# Patient Record
Sex: FEMALE | Race: BLACK OR AFRICAN AMERICAN | Employment: FULL TIME | URBAN - METROPOLITAN AREA
[De-identification: names, ages, dates, MRNs, and addresses within clinical notes are randomized per-mention and may not be internally consistent; named-entity substitution may affect disease eponyms.]

---

## 2017-01-17 ENCOUNTER — OFFICE VISIT (OUTPATIENT)
Dept: URGENT CARE | Facility: URGENT CARE | Age: 22
End: 2017-01-17
Payer: COMMERCIAL

## 2017-01-17 VITALS
HEART RATE: 94 BPM | HEIGHT: 66 IN | TEMPERATURE: 98.4 F | OXYGEN SATURATION: 97 % | DIASTOLIC BLOOD PRESSURE: 68 MMHG | SYSTOLIC BLOOD PRESSURE: 118 MMHG | BODY MASS INDEX: 27.11 KG/M2 | WEIGHT: 168.7 LBS

## 2017-01-17 DIAGNOSIS — J03.90 SUPPURATIVE TONSILLITIS: Primary | ICD-10-CM

## 2017-01-17 DIAGNOSIS — R07.0 THROAT PAIN: ICD-10-CM

## 2017-01-17 LAB
DEPRECATED S PYO AG THROAT QL EIA: NORMAL
MICRO REPORT STATUS: NORMAL
SPECIMEN SOURCE: NORMAL

## 2017-01-17 PROCEDURE — 87081 CULTURE SCREEN ONLY: CPT | Performed by: PHYSICIAN ASSISTANT

## 2017-01-17 PROCEDURE — 87880 STREP A ASSAY W/OPTIC: CPT | Performed by: PHYSICIAN ASSISTANT

## 2017-01-17 PROCEDURE — 99214 OFFICE O/P EST MOD 30 MIN: CPT | Performed by: PHYSICIAN ASSISTANT

## 2017-01-17 RX ORDER — DIPHENHYDRAMINE HCL 25 MG
25 CAPSULE ORAL
Qty: 20 CAPSULE | Refills: 0 | Status: ON HOLD | OUTPATIENT
Start: 2017-01-17 | End: 2017-09-14

## 2017-01-17 RX ORDER — AMOXICILLIN 875 MG
875 TABLET ORAL 2 TIMES DAILY
Qty: 20 TABLET | Refills: 0 | Status: ON HOLD | OUTPATIENT
Start: 2017-01-17 | End: 2017-09-14

## 2017-01-17 RX ORDER — IBUPROFEN 800 MG/1
800 TABLET, FILM COATED ORAL EVERY 8 HOURS PRN
Qty: 100 TABLET | Refills: 0 | Status: ON HOLD | OUTPATIENT
Start: 2017-01-17 | End: 2017-09-14

## 2017-01-17 NOTE — Clinical Note
[facility]  [address] [id number] [phone]      January 17, 2017    RE:  Lashell Munguia                                                                                                                                                       [address] [facility] [address]            To whom it may concern:    Lashell Munguia was seen in clinic today for illness.  She may return to work when she has been fever free for 24 hours.        Sincerely,        Martina Maloney    [facility]

## 2017-01-18 NOTE — NURSING NOTE
"Chief Complaint   Patient presents with     Throat Problem     sore throat     Otalgia     Bilateral ear pain on and off       Initial /68 mmHg  Pulse 94  Temp(Src) 98.4  F (36.9  C) (Oral)  Ht 5' 6\" (1.676 m)  Wt 168 lb 11.2 oz (76.522 kg)  BMI 27.24 kg/m2  SpO2 97% Estimated body mass index is 27.24 kg/(m^2) as calculated from the following:    Height as of this encounter: 5' 6\" (1.676 m).    Weight as of this encounter: 168 lb 11.2 oz (76.522 kg).  BP completed using cuff size: regular    "

## 2017-01-18 NOTE — PROGRESS NOTES
"SUBJECTIVE:   Lashell Munguia is a 21 year old female presenting with a chief complaint of   1) runny nose and congestion for the past week  2) sore throat for the past 3 days  3) ear discomfort bilaterally.  Onset of symptoms was as above.  Course of illness is worsening.    Severity moderate  Current and Associated symptoms: as above  Treatment measures tried include OTC meds.  Predisposing factors include None.    Past Medical History   Diagnosis Date     Migraines 6/20/2013     Migraine with aura 6/20/2013     Excessive sweating, local 6/20/2013     Chlamydia infection 6/21/2013     Patient Active Problem List   Diagnosis     Migraine with aura     Excessive sweating, local     Chlamydia infection     Social History   Substance Use Topics     Smoking status: Never Smoker      Smokeless tobacco: Never Used      Comment: MOM SMOKES     Alcohol Use: No      Comment: MR-6/20/2013       ROS:  CONSTITUTIONAL:NEGATIVE for fever, chills, change in weight  INTEGUMENTARY/SKIN: NEGATIVE for worrisome rashes, moles or lesions  EYES: NEGATIVE for vision changes or irritation  ENT/MOUTH: as per HPI  RESP:as per HPI  CV: NEGATIVE for chest pain, palpitations or peripheral edema  GI: NEGATIVE for nausea, abdominal pain, heartburn, or change in bowel habits    OBJECTIVE  :/68 mmHg  Pulse 94  Temp(Src) 98.4  F (36.9  C) (Oral)  Ht 5' 6\" (1.676 m)  Wt 168 lb 11.2 oz (76.522 kg)  BMI 27.24 kg/m2  SpO2 97%  GENERAL APPEARANCE: healthy, alert and no distress  EYES: EOMI,  PERRL, conjunctiva clear  HENT: ear canals and TM's normal.  Nose and mouth without ulcers, erythema or lesions  HENT: tonsillar hypertrophy, tonsillar erythema and tonsillar exudate  NECK: supple, nontender, no lymphadenopathy  RESP: lungs clear to auscultation - no rales, rhonchi or wheezes  CV: regular rates and rhythm, normal S1 S2, no murmur noted  ABDOMEN:  soft, nontender, no HSM or masses and bowel sounds normal  NEURO: Normal strength and " tone, sensory exam grossly normal,  normal speech and mentation  SKIN: no suspicious lesions or rashes    (J03.90) Suppurative tonsillitis  (primary encounter diagnosis)  Comment:   Plan: ibuprofen (ADVIL/MOTRIN) 800 MG tablet,         amoxicillin (AMOXIL) 875 MG tablet        Salt water gargles.      (R07.0) Throat pain  Comment:   Plan: Strep, Rapid Screen, Beta strep group A         culture, diphenhydrAMINE (BENADRYL ALLERGY) 25         MG capsule            F/U with PCP should symptoms persist or worsen.   Patient expresses understanding and agreement with the assessment and plan as above.

## 2017-01-19 LAB
BACTERIA SPEC CULT: NORMAL
MICRO REPORT STATUS: NORMAL
SPECIMEN SOURCE: NORMAL

## 2017-03-01 DIAGNOSIS — Z32.01 PREGNANCY TEST POSITIVE: Primary | ICD-10-CM

## 2017-03-08 ENCOUNTER — PRENATAL OFFICE VISIT (OUTPATIENT)
Dept: NURSING | Facility: CLINIC | Age: 22
End: 2017-03-08
Payer: COMMERCIAL

## 2017-03-08 DIAGNOSIS — Z32.01 PREGNANCY TEST POSITIVE: ICD-10-CM

## 2017-03-08 DIAGNOSIS — Z33.1 PREGNANT STATE, INCIDENTAL: Primary | ICD-10-CM

## 2017-03-08 LAB
ABO + RH BLD: NORMAL
ABO + RH BLD: NORMAL
BETA HCG QUAL IFA URINE: POSITIVE
BLD GP AB SCN SERPL QL: NORMAL
BLOOD BANK CMNT PATIENT-IMP: NORMAL
ERYTHROCYTE [DISTWIDTH] IN BLOOD BY AUTOMATED COUNT: 13.9 % (ref 10–15)
HBV SURFACE AG SERPL QL IA: NONREACTIVE
HCT VFR BLD AUTO: 34.5 % (ref 35–47)
HGB BLD-MCNC: 11.9 G/DL (ref 11.7–15.7)
HIV 1+2 AB+HIV1 P24 AG SERPL QL IA: NORMAL
MCH RBC QN AUTO: 27.7 PG (ref 26.5–33)
MCHC RBC AUTO-ENTMCNC: 34.5 G/DL (ref 31.5–36.5)
MCV RBC AUTO: 80 FL (ref 78–100)
PLATELET # BLD AUTO: 321 10E9/L (ref 150–450)
RBC # BLD AUTO: 4.29 10E12/L (ref 3.8–5.2)
SPECIMEN EXP DATE BLD: NORMAL
WBC # BLD AUTO: 5.8 10E9/L (ref 4–11)

## 2017-03-08 PROCEDURE — 86780 TREPONEMA PALLIDUM: CPT | Performed by: OBSTETRICS & GYNECOLOGY

## 2017-03-08 PROCEDURE — 87389 HIV-1 AG W/HIV-1&-2 AB AG IA: CPT | Performed by: OBSTETRICS & GYNECOLOGY

## 2017-03-08 PROCEDURE — 87340 HEPATITIS B SURFACE AG IA: CPT | Performed by: OBSTETRICS & GYNECOLOGY

## 2017-03-08 PROCEDURE — 87086 URINE CULTURE/COLONY COUNT: CPT | Performed by: OBSTETRICS & GYNECOLOGY

## 2017-03-08 PROCEDURE — 84703 CHORIONIC GONADOTROPIN ASSAY: CPT | Performed by: OBSTETRICS & GYNECOLOGY

## 2017-03-08 PROCEDURE — 86900 BLOOD TYPING SEROLOGIC ABO: CPT | Performed by: OBSTETRICS & GYNECOLOGY

## 2017-03-08 PROCEDURE — 86762 RUBELLA ANTIBODY: CPT | Performed by: OBSTETRICS & GYNECOLOGY

## 2017-03-08 PROCEDURE — 87088 URINE BACTERIA CULTURE: CPT | Performed by: OBSTETRICS & GYNECOLOGY

## 2017-03-08 PROCEDURE — 86901 BLOOD TYPING SEROLOGIC RH(D): CPT | Performed by: OBSTETRICS & GYNECOLOGY

## 2017-03-08 PROCEDURE — 85027 COMPLETE CBC AUTOMATED: CPT | Performed by: OBSTETRICS & GYNECOLOGY

## 2017-03-08 PROCEDURE — 99207 ZZC NO CHARGE NURSE ONLY: CPT

## 2017-03-08 PROCEDURE — 86850 RBC ANTIBODY SCREEN: CPT | Performed by: OBSTETRICS & GYNECOLOGY

## 2017-03-08 PROCEDURE — 36415 COLL VENOUS BLD VENIPUNCTURE: CPT | Performed by: OBSTETRICS & GYNECOLOGY

## 2017-03-08 NOTE — MR AVS SNAPSHOT
"              After Visit Summary   3/8/2017    Lashell Munguia    MRN: [id number]           Patient Information     Date Of Birth          1995        Visit Information        Provider Department      3/8/2017 9:00 AM OX PRENATAL NURSE [facility]        Today's Diagnoses     Pregnant state, incidental    -  1    Pregnancy test positive           Follow-ups after your visit        Who to contact     If you have questions or need follow up information about today's clinic visit or your schedule please contact [facility] directly at [phone].  Normal or non-critical lab and imaging results will be communicated to you by [employer], letter or phone within 4 business days after the clinic has received the results. If you do not hear from us within 7 days, please contact the clinic through Home Delivery Service (HDS)t or phone. If you have a critical or abnormal lab result, we will notify you by phone as soon as possible.  Submit refill requests through [employer] or call your pharmacy and they will forward the refill request to us. Please allow 3 business days for your refill to be completed.          Additional Information About Your Visit        MyChart Information     [employer] lets you send messages to your doctor, view your test results, renew your prescriptions, schedule appointments and more. To sign up, go to [email or web address] . Click on \"Log in\" on the left side of the screen, which will take you to the Welcome page. Then click on \"Sign up Now\" on the right side of the page.     You will be asked to enter the access code listed below, as well as some personal information. Please follow the directions to create your username and password.     Your access code is: [email or web address]  Expires: 2017 12:24 PM     Your access code will  in 90 days. If you need help or a new code, please call your [facility] or [phone].        Care EveryWhere ID     This is your " Care EveryWhere ID. This could be used by other organizations to access your [facility] medical records  [id number]        Your Vitals Were     Last Period                   11/18/2016            Blood Pressure from Last 3 Encounters:   01/17/17 118/68   11/28/16 129/72   06/28/16 104/60    Weight from Last 3 Encounters:   01/17/17 168 lb 11.2 oz (76.5 kg)   11/28/16 150 lb (68 kg)   06/28/16 167 lb (75.8 kg)              We Performed the Following     ABO/Rh type and screen     Anti Treponema     Beta HCG qual IFA urine     CBC with platelets     Hepatitis B surface antigen     HIV Antigen Antibody Combo     Rubella Antibody IgG Quantitative     Urine Culture Aerobic Bacterial        Primary Care Provider Office Phone # Fax #    Nancy Maya Patrick [phone]       [facility] [address] [facility] [address]        Thank you!     Thank you for choosing [facility]  for your care. Our goal is always to provide you with excellent care. Hearing back from our patients is one way we can continue to improve our services. Please take a few minutes to complete the written survey that you may receive in the mail after your visit with us. Thank you!             Your Updated Medication List - Protect others around you: Learn how to safely use, store and throw away your medicines at www.disposemymeds.org.          This list is accurate as of: 3/8/17 11:59 PM.  Always use your most recent med list.                   Brand Name Dispense Instructions for use    amoxicillin 875 MG tablet    AMOXIL    20 tablet    Take 1 tablet (875 mg) by mouth 2 times daily       diphenhydrAMINE 25 MG capsule    BENADRYL ALLERGY    20 capsule    Take 1 capsule (25 mg) by mouth nightly as needed for itching or allergies       ibuprofen 800 MG tablet    ADVIL/MOTRIN    100 tablet    Take 1 tablet (800 mg) by mouth every 8 hours as needed for moderate pain       NO ACTIVE MEDICATIONS

## 2017-03-09 LAB
BACTERIA SPEC CULT: ABNORMAL
MICRO REPORT STATUS: ABNORMAL
RUBV IGG SERPL IA-ACNC: 11 IU/ML
SPECIMEN SOURCE: ABNORMAL
T PALLIDUM IGG+IGM SER QL: NEGATIVE

## 2017-03-10 ENCOUNTER — TELEPHONE (OUTPATIENT)
Dept: OBGYN | Facility: CLINIC | Age: 22
End: 2017-03-10

## 2017-03-10 DIAGNOSIS — A49.1 GBS (GROUP B STREPTOCOCCUS) INFECTION: Primary | ICD-10-CM

## 2017-03-10 RX ORDER — AMOXICILLIN 500 MG/1
500 TABLET, FILM COATED ORAL 2 TIMES DAILY
Qty: 14 TABLET | Refills: 0 | Status: SHIPPED | OUTPATIENT
Start: 2017-03-10 | End: 2017-03-17

## 2017-03-16 ENCOUNTER — RADIANT APPOINTMENT (OUTPATIENT)
Dept: ULTRASOUND IMAGING | Facility: CLINIC | Age: 22
End: 2017-03-16
Attending: OBSTETRICS & GYNECOLOGY
Payer: COMMERCIAL

## 2017-03-16 DIAGNOSIS — Z32.01 PREGNANCY TEST POSITIVE: ICD-10-CM

## 2017-03-16 PROCEDURE — 76801 OB US < 14 WKS SINGLE FETUS: CPT | Performed by: OBSTETRICS & GYNECOLOGY

## 2017-04-11 NOTE — NURSING NOTE
"NEW PRENATAL EDUCATION  The following encounter information was actually performed during a group prenatal education class on 3-8-17 by RESHMA Fox RN and entered at a later date.  Patient given information for prenatal education along with \"The Expectant Family\" booklet.     "

## 2017-08-19 ENCOUNTER — HEALTH MAINTENANCE LETTER (OUTPATIENT)
Age: 22
End: 2017-08-19

## 2017-08-29 LAB — GROUP B STREP PCR: POSITIVE

## 2017-09-14 ENCOUNTER — ANESTHESIA (OUTPATIENT)
Dept: OBGYN | Facility: CLINIC | Age: 22
End: 2017-09-14
Payer: COMMERCIAL

## 2017-09-14 ENCOUNTER — HOSPITAL ENCOUNTER (INPATIENT)
Facility: CLINIC | Age: 22
LOS: 2 days | Discharge: HOME | End: 2017-09-16
Attending: OBSTETRICS & GYNECOLOGY | Admitting: OBSTETRICS & GYNECOLOGY
Payer: COMMERCIAL

## 2017-09-14 ENCOUNTER — ANESTHESIA EVENT (OUTPATIENT)
Dept: OBGYN | Facility: CLINIC | Age: 22
End: 2017-09-14
Payer: COMMERCIAL

## 2017-09-14 PROBLEM — Z36.89 ENCOUNTER FOR TRIAGE IN PREGNANT PATIENT: Status: ACTIVE | Noted: 2017-09-14

## 2017-09-14 PROBLEM — Z34.80 SUPERVISION OF NORMAL IUP (INTRAUTERINE PREGNANCY) IN MULTIGRAVIDA, UNSPECIFIED TRIMESTER: Status: ACTIVE | Noted: 2017-09-14

## 2017-09-14 LAB
ABO + RH BLD: NORMAL
ABO + RH BLD: NORMAL
BASOPHILS # BLD AUTO: 0 10E9/L (ref 0–0.2)
BASOPHILS NFR BLD AUTO: 0.1 %
BLOOD BANK CMNT PATIENT-IMP: NORMAL
DIFFERENTIAL METHOD BLD: ABNORMAL
EOSINOPHIL # BLD AUTO: 0 10E9/L (ref 0–0.7)
EOSINOPHIL NFR BLD AUTO: 0.5 %
ERYTHROCYTE [DISTWIDTH] IN BLOOD BY AUTOMATED COUNT: 13.3 % (ref 10–15)
HCT VFR BLD AUTO: 29.4 % (ref 35–47)
HGB BLD-MCNC: 10.2 G/DL (ref 11.7–15.7)
IMM GRANULOCYTES # BLD: 0 10E9/L (ref 0–0.4)
IMM GRANULOCYTES NFR BLD: 0.1 %
LYMPHOCYTES # BLD AUTO: 2.3 10E9/L (ref 0.8–5.3)
LYMPHOCYTES NFR BLD AUTO: 29.2 %
MCH RBC QN AUTO: 26.8 PG (ref 26.5–33)
MCHC RBC AUTO-ENTMCNC: 34.7 G/DL (ref 31.5–36.5)
MCV RBC AUTO: 77 FL (ref 78–100)
MONOCYTES # BLD AUTO: 0.8 10E9/L (ref 0–1.3)
MONOCYTES NFR BLD AUTO: 10.6 %
NEUTROPHILS # BLD AUTO: 4.7 10E9/L (ref 1.6–8.3)
NEUTROPHILS NFR BLD AUTO: 59.5 %
NRBC # BLD AUTO: 0 10*3/UL
NRBC BLD AUTO-RTO: 0 /100
PLATELET # BLD AUTO: 236 10E9/L (ref 150–450)
RBC # BLD AUTO: 3.81 10E12/L (ref 3.8–5.2)
SPECIMEN EXP DATE BLD: NORMAL
T PALLIDUM IGG+IGM SER QL: NEGATIVE
WBC # BLD AUTO: 7.9 10E9/L (ref 4–11)

## 2017-09-14 PROCEDURE — 10907ZC DRAINAGE OF AMNIOTIC FLUID, THERAPEUTIC FROM PRODUCTS OF CONCEPTION, VIA NATURAL OR ARTIFICIAL OPENING: ICD-10-PCS | Performed by: OBSTETRICS & GYNECOLOGY

## 2017-09-14 PROCEDURE — 00HU33Z INSERTION OF INFUSION DEVICE INTO SPINAL CANAL, PERCUTANEOUS APPROACH: ICD-10-PCS | Performed by: ANESTHESIOLOGY

## 2017-09-14 PROCEDURE — 12000037 ZZH R&B POSTPARTUM INTERMEDIATE

## 2017-09-14 PROCEDURE — 86901 BLOOD TYPING SEROLOGIC RH(D): CPT | Performed by: OBSTETRICS & GYNECOLOGY

## 2017-09-14 PROCEDURE — 36415 COLL VENOUS BLD VENIPUNCTURE: CPT | Performed by: OBSTETRICS & GYNECOLOGY

## 2017-09-14 PROCEDURE — 25000132 ZZH RX MED GY IP 250 OP 250 PS 637: Performed by: OBSTETRICS & GYNECOLOGY

## 2017-09-14 PROCEDURE — 59025 FETAL NON-STRESS TEST: CPT

## 2017-09-14 PROCEDURE — 25000128 H RX IP 250 OP 636: Performed by: OBSTETRICS & GYNECOLOGY

## 2017-09-14 PROCEDURE — 25000128 H RX IP 250 OP 636: Performed by: ANESTHESIOLOGY

## 2017-09-14 PROCEDURE — 86780 TREPONEMA PALLIDUM: CPT | Performed by: OBSTETRICS & GYNECOLOGY

## 2017-09-14 PROCEDURE — 86900 BLOOD TYPING SEROLOGIC ABO: CPT | Performed by: OBSTETRICS & GYNECOLOGY

## 2017-09-14 PROCEDURE — 99215 OFFICE O/P EST HI 40 MIN: CPT | Mod: 25

## 2017-09-14 PROCEDURE — 85025 COMPLETE CBC W/AUTO DIFF WBC: CPT | Performed by: OBSTETRICS & GYNECOLOGY

## 2017-09-14 PROCEDURE — 25000125 ZZHC RX 250: Performed by: OBSTETRICS & GYNECOLOGY

## 2017-09-14 PROCEDURE — 72200001 ZZH LABOR CARE VAGINAL DELIVERY SINGLE

## 2017-09-14 PROCEDURE — 3E0R3CZ INTRODUCTION OF REGIONAL ANESTHETIC INTO SPINAL CANAL, PERCUTANEOUS APPROACH: ICD-10-PCS | Performed by: ANESTHESIOLOGY

## 2017-09-14 PROCEDURE — 37000011 ZZH ANESTHESIA WARD SERVICE

## 2017-09-14 RX ORDER — FENTANYL CITRATE 50 UG/ML
100 INJECTION, SOLUTION INTRAMUSCULAR; INTRAVENOUS ONCE
Status: COMPLETED | OUTPATIENT
Start: 2017-09-14 | End: 2017-09-14

## 2017-09-14 RX ORDER — CARBOPROST TROMETHAMINE 250 UG/ML
250 INJECTION, SOLUTION INTRAMUSCULAR
Status: DISCONTINUED | OUTPATIENT
Start: 2017-09-14 | End: 2017-09-14

## 2017-09-14 RX ORDER — NALOXONE HYDROCHLORIDE 0.4 MG/ML
.1-.4 INJECTION, SOLUTION INTRAMUSCULAR; INTRAVENOUS; SUBCUTANEOUS
Status: DISCONTINUED | OUTPATIENT
Start: 2017-09-14 | End: 2017-09-14

## 2017-09-14 RX ORDER — OXYCODONE HYDROCHLORIDE 5 MG/1
5-10 TABLET ORAL
Status: DISCONTINUED | OUTPATIENT
Start: 2017-09-14 | End: 2017-09-16 | Stop reason: HOSPADM

## 2017-09-14 RX ORDER — ROPIVACAINE HYDROCHLORIDE 2 MG/ML
10 INJECTION, SOLUTION EPIDURAL; INFILTRATION; PERINEURAL ONCE
Status: COMPLETED | OUTPATIENT
Start: 2017-09-14 | End: 2017-09-14

## 2017-09-14 RX ORDER — OXYTOCIN 10 [USP'U]/ML
10 INJECTION, SOLUTION INTRAMUSCULAR; INTRAVENOUS
Status: DISCONTINUED | OUTPATIENT
Start: 2017-09-14 | End: 2017-09-16 | Stop reason: HOSPADM

## 2017-09-14 RX ORDER — BISACODYL 10 MG
10 SUPPOSITORY, RECTAL RECTAL DAILY PRN
Status: DISCONTINUED | OUTPATIENT
Start: 2017-09-16 | End: 2017-09-16 | Stop reason: HOSPADM

## 2017-09-14 RX ORDER — FENTANYL CITRATE 50 UG/ML
50-100 INJECTION, SOLUTION INTRAMUSCULAR; INTRAVENOUS
Status: DISCONTINUED | OUTPATIENT
Start: 2017-09-14 | End: 2017-09-14

## 2017-09-14 RX ORDER — METHYLERGONOVINE MALEATE 0.2 MG/ML
200 INJECTION INTRAVENOUS
Status: DISCONTINUED | OUTPATIENT
Start: 2017-09-14 | End: 2017-09-14

## 2017-09-14 RX ORDER — ONDANSETRON 2 MG/ML
4 INJECTION INTRAMUSCULAR; INTRAVENOUS EVERY 6 HOURS PRN
Status: DISCONTINUED | OUTPATIENT
Start: 2017-09-14 | End: 2017-09-14

## 2017-09-14 RX ORDER — HYDROCORTISONE 2.5 %
CREAM (GRAM) TOPICAL 3 TIMES DAILY PRN
Status: DISCONTINUED | OUTPATIENT
Start: 2017-09-14 | End: 2017-09-16 | Stop reason: HOSPADM

## 2017-09-14 RX ORDER — IBUPROFEN 400 MG/1
400-800 TABLET, FILM COATED ORAL EVERY 6 HOURS PRN
Status: DISCONTINUED | OUTPATIENT
Start: 2017-09-14 | End: 2017-09-16 | Stop reason: HOSPADM

## 2017-09-14 RX ORDER — MISOPROSTOL 200 UG/1
400 TABLET ORAL
Status: DISCONTINUED | OUTPATIENT
Start: 2017-09-14 | End: 2017-09-16 | Stop reason: HOSPADM

## 2017-09-14 RX ORDER — OXYTOCIN/0.9 % SODIUM CHLORIDE 30/500 ML
1-24 PLASTIC BAG, INJECTION (ML) INTRAVENOUS CONTINUOUS
Status: DISCONTINUED | OUTPATIENT
Start: 2017-09-14 | End: 2017-09-14

## 2017-09-14 RX ORDER — EPHEDRINE SULFATE 50 MG/ML
5 INJECTION, SOLUTION INTRAMUSCULAR; INTRAVENOUS; SUBCUTANEOUS
Status: DISCONTINUED | OUTPATIENT
Start: 2017-09-14 | End: 2017-09-14

## 2017-09-14 RX ORDER — NALBUPHINE HYDROCHLORIDE 10 MG/ML
2.5-5 INJECTION, SOLUTION INTRAMUSCULAR; INTRAVENOUS; SUBCUTANEOUS EVERY 6 HOURS PRN
Status: DISCONTINUED | OUTPATIENT
Start: 2017-09-14 | End: 2017-09-14

## 2017-09-14 RX ORDER — AMOXICILLIN 250 MG
1-2 CAPSULE ORAL 2 TIMES DAILY
Status: DISCONTINUED | OUTPATIENT
Start: 2017-09-14 | End: 2017-09-16 | Stop reason: HOSPADM

## 2017-09-14 RX ORDER — OXYTOCIN/0.9 % SODIUM CHLORIDE 30/500 ML
340 PLASTIC BAG, INJECTION (ML) INTRAVENOUS CONTINUOUS PRN
Status: DISCONTINUED | OUTPATIENT
Start: 2017-09-14 | End: 2017-09-16 | Stop reason: HOSPADM

## 2017-09-14 RX ORDER — SODIUM CHLORIDE, SODIUM LACTATE, POTASSIUM CHLORIDE, CALCIUM CHLORIDE 600; 310; 30; 20 MG/100ML; MG/100ML; MG/100ML; MG/100ML
INJECTION, SOLUTION INTRAVENOUS CONTINUOUS
Status: DISCONTINUED | OUTPATIENT
Start: 2017-09-14 | End: 2017-09-14

## 2017-09-14 RX ORDER — NALOXONE HYDROCHLORIDE 0.4 MG/ML
.1-.4 INJECTION, SOLUTION INTRAMUSCULAR; INTRAVENOUS; SUBCUTANEOUS
Status: DISCONTINUED | OUTPATIENT
Start: 2017-09-14 | End: 2017-09-16 | Stop reason: HOSPADM

## 2017-09-14 RX ORDER — LIDOCAINE 40 MG/G
CREAM TOPICAL
Status: DISCONTINUED | OUTPATIENT
Start: 2017-09-14 | End: 2017-09-14

## 2017-09-14 RX ORDER — OXYTOCIN/0.9 % SODIUM CHLORIDE 30/500 ML
100 PLASTIC BAG, INJECTION (ML) INTRAVENOUS CONTINUOUS
Status: DISCONTINUED | OUTPATIENT
Start: 2017-09-14 | End: 2017-09-16 | Stop reason: HOSPADM

## 2017-09-14 RX ORDER — OXYTOCIN/0.9 % SODIUM CHLORIDE 30/500 ML
100-340 PLASTIC BAG, INJECTION (ML) INTRAVENOUS CONTINUOUS PRN
Status: DISCONTINUED | OUTPATIENT
Start: 2017-09-14 | End: 2017-09-14

## 2017-09-14 RX ORDER — OXYCODONE AND ACETAMINOPHEN 5; 325 MG/1; MG/1
1 TABLET ORAL
Status: DISCONTINUED | OUTPATIENT
Start: 2017-09-14 | End: 2017-09-14

## 2017-09-14 RX ORDER — ACETAMINOPHEN 325 MG/1
650 TABLET ORAL EVERY 4 HOURS PRN
Status: DISCONTINUED | OUTPATIENT
Start: 2017-09-14 | End: 2017-09-16 | Stop reason: HOSPADM

## 2017-09-14 RX ORDER — ACETAMINOPHEN 325 MG/1
650 TABLET ORAL EVERY 4 HOURS PRN
Status: DISCONTINUED | OUTPATIENT
Start: 2017-09-14 | End: 2017-09-14

## 2017-09-14 RX ORDER — PENICILLIN G POTASSIUM 5000000 [IU]/1
5 INJECTION, POWDER, FOR SOLUTION INTRAMUSCULAR; INTRAVENOUS ONCE
Status: COMPLETED | OUTPATIENT
Start: 2017-09-14 | End: 2017-09-14

## 2017-09-14 RX ORDER — LANOLIN 100 %
OINTMENT (GRAM) TOPICAL
Status: DISCONTINUED | OUTPATIENT
Start: 2017-09-14 | End: 2017-09-16 | Stop reason: HOSPADM

## 2017-09-14 RX ORDER — OXYTOCIN 10 [USP'U]/ML
10 INJECTION, SOLUTION INTRAMUSCULAR; INTRAVENOUS
Status: DISCONTINUED | OUTPATIENT
Start: 2017-09-14 | End: 2017-09-14

## 2017-09-14 RX ORDER — IBUPROFEN 400 MG/1
800 TABLET, FILM COATED ORAL
Status: COMPLETED | OUTPATIENT
Start: 2017-09-14 | End: 2017-09-14

## 2017-09-14 RX ADMIN — ROPIVACAINE HYDROCHLORIDE 10 ML: 2 INJECTION, SOLUTION EPIDURAL; INFILTRATION at 09:36

## 2017-09-14 RX ADMIN — IBUPROFEN 800 MG: 400 TABLET ORAL at 14:02

## 2017-09-14 RX ADMIN — SODIUM CHLORIDE, POTASSIUM CHLORIDE, SODIUM LACTATE AND CALCIUM CHLORIDE: 600; 310; 30; 20 INJECTION, SOLUTION INTRAVENOUS at 09:50

## 2017-09-14 RX ADMIN — SODIUM CHLORIDE 2.5 MILLION UNITS: 9 INJECTION, SOLUTION INTRAVENOUS at 06:02

## 2017-09-14 RX ADMIN — SENNOSIDES AND DOCUSATE SODIUM 1 TABLET: 8.6; 5 TABLET ORAL at 20:02

## 2017-09-14 RX ADMIN — SODIUM CHLORIDE 2.5 MILLION UNITS: 9 INJECTION, SOLUTION INTRAVENOUS at 10:01

## 2017-09-14 RX ADMIN — OXYTOCIN-SODIUM CHLORIDE 0.9% IV SOLN 30 UNIT/500ML 2 MILLI-UNITS/MIN: 30-0.9/5 SOLUTION at 07:37

## 2017-09-14 RX ADMIN — SODIUM CHLORIDE, POTASSIUM CHLORIDE, SODIUM LACTATE AND CALCIUM CHLORIDE: 600; 310; 30; 20 INJECTION, SOLUTION INTRAVENOUS at 02:08

## 2017-09-14 RX ADMIN — SODIUM CHLORIDE, POTASSIUM CHLORIDE, SODIUM LACTATE AND CALCIUM CHLORIDE: 600; 310; 30; 20 INJECTION, SOLUTION INTRAVENOUS at 07:38

## 2017-09-14 RX ADMIN — Medication 12 ML/HR: at 09:45

## 2017-09-14 RX ADMIN — IBUPROFEN 800 MG: 400 TABLET ORAL at 20:02

## 2017-09-14 RX ADMIN — PENICILLIN G POTASSIUM 5 MILLION UNITS: 5000000 POWDER, FOR SOLUTION INTRAMUSCULAR; INTRAPLEURAL; INTRATHECAL; INTRAVENOUS at 02:06

## 2017-09-14 RX ADMIN — FENTANYL CITRATE 100 MCG: 50 INJECTION, SOLUTION INTRAMUSCULAR; INTRAVENOUS at 09:36

## 2017-09-14 RX ADMIN — SODIUM CHLORIDE, POTASSIUM CHLORIDE, SODIUM LACTATE AND CALCIUM CHLORIDE 1000 ML: 600; 310; 30; 20 INJECTION, SOLUTION INTRAVENOUS at 08:53

## 2017-09-14 NOTE — IP AVS SNAPSHOT
MRN:[id number]                      After Visit Summary   9/14/2017    Lashell Munguia    MRN: [id number]           Thank you!     Thank you for choosing [facility] for your care. Our goal is always to provide you with excellent care. Hearing back from our patients is one way we can continue to improve our services. Please take a few minutes to complete the written survey that you may receive in the mail after you visit with us. Thank you!        Patient Information     Date Of Birth          1995        About your hospital stay     You were admitted on:  September 14, 2017 You last received care in the:   [facility]    You were discharged on:  September 16, 2017       Who to Call     For medical emergencies, please call 911.  For non-urgent questions about your medical care, please call your primary care provider or clinic, [phone]          Attending Provider     Provider Specialty    Damian Morales MD OB/Gyn    Junior, Charlotte Trejo MD OB/Gyn       Primary Care Provider Office Phone # Fax #    Nancy Maya Patrick [phone]      After Care Instructions     Activity       Review discharge instructions            Activity       Review discharge instructions            Diet       Resume previous diet            Diet       Resume previous diet            Discharge Instructions - Gestational diabetic patients       Gestational diabetic patients to follow up for fasting blood sugar and 2 hour 75gm glucose load at 6 weeks postpartum.            Discharge Instructions - Postpartum visit       Schedule postpartum visit with your provider and return to clinic in 6 weeks.            Discharge Instructions - Postpartum visit       Schedule postpartum visit with your provider and return to clinic in 6 weeks.                  Further instructions from your care team       Postpartum Vaginal Delivery Instructions    Activity       Ask family and friends for help when  you need it.    Do not place anything in your vagina for 6 weeks.    You are not restricted on other activities, but take it easy for a few weeks to allow your body to recover from delivery.  You are able to do any activities you feel up to that point.    No driving until you have stopped taking your pain medications (usually two weeks after delivery).     Call your health care provider if you have any of these symptoms:       Increased pain, swelling, redness, or fluid around your stiches from an episiotomy or perineal tear.    A fever above 100.4 F (38 C) with or without chills when placing a thermometer under your tongue.    You soak a sanitary pad with blood within 1 hour, or you see blood clots larger than a golf ball.    Bleeding that lasts more than 6 weeks.    Vaginal discharge that smells bad.    Severe pain, cramping or tenderness in your lower belly area.    A need to urinate more frequently (use the toilet more often), more urgently (use the toilet very quickly), or it burns when you urinate.    Nausea and vomiting.    Redness, swelling or pain around a vein in your leg.    Problems breastfeeding or a red or painful area on your breast.    Chest pain and cough or are gasping for air.    Problems coping with sadness, anxiety, or depression.  If you have any concerns about hurting yourself or the baby, call your provider immediately.     You have questions or concerns after you return home.     Keep your hands clean:  Always wash your hands before touching your perineal area and stitches.  This helps reduce your risk of infection.  If your hands aren't dirty, you may use an alcohol hand-rub to clean your hands. Keep your nails clean and short.        Pending Results     No orders found from 9/12/2017 to 9/15/2017.            Statement of Approval     Ordered          09/16/17 0945  I have reviewed and agree with all the recommendations and orders detailed in this document.  EFFECTIVE NOW     Approved and  "electronically signed by:  Aiedn Rowell MD             Admission Information     Date & Time Provider Department Dept. Phone    2017 Charlotte Junior MD  [facility] [phone]      Your Vitals Were     Blood Pressure Temperature Respirations Height Weight Last Period     98.2  F (36.8  C) (Oral) 16 1.676 m (5' 6\") 78.9 kg (174 lb) 2016    Pulse Oximetry BMI (Body Mass Index)                97% 28.08 kg/m2          [employer] Information     [employer] lets you send messages to your doctor, view your test results, renew your prescriptions, schedule appointments and more. To sign up, go to [email or web address] . Click on \"Log in\" on the left side of the screen, which will take you to the Welcome page. Then click on \"Sign up Now\" on the right side of the page.     You will be asked to enter the access code listed below, as well as some personal information. Please follow the directions to create your username and password.     Your access code is: [email or web address]  Expires: 12/15/2017 10:05 AM     Your access code will  in 90 days. If you need help or a new code, please call your [facility] clinic or [phone].        Care EveryWhere ID     This is your Care EveryWhere ID. This could be used by other organizations to access your [facility] medical records  [id number]        Equal Access to Services     [facility] ROBERT AH: Hadii cain Eng, waaxda lugurpreetadaha, qaybta kaalmada delon, ravi roe. So [facility] [phone].    ATENCIÓN: Si habla español, tiene a scruggs disposición servicios gratuitos de asistencia lingüística. Llame al [phone].    We comply with applicable federal civil rights laws and [place] laws. We do not discriminate on the basis of race, color, national origin, age, disability sex, sexual orientation or gender identity.               Review of your medicines      START taking        Dose / Directions    ibuprofen 400 MG tablet "   Commonly known as:  ADVIL/MOTRIN        Dose:  400-800 mg   Take 1-2 tablets (400-800 mg) by mouth every 6 hours as needed for other (cramping)   Quantity:  40 tablet   Refills:  1         CONTINUE these medicines which have NOT CHANGED        Dose / Directions    NO ACTIVE MEDICATIONS        Refills:  0            Where to get your medicines      These medications were sent to [facility] [place], [place] - [address]  [address], [place] [address]     Phone:  [phone]     ibuprofen 400 MG tablet                Protect others around you: Learn how to safely use, store and throw away your medicines at www.disposemymeds.org.             Medication List: This is a list of all your medications and when to take them. Check marks below indicate your daily home schedule. Keep this list as a reference.      Medications           Morning Afternoon Evening Bedtime As Needed    ibuprofen 400 MG tablet   Commonly known as:  ADVIL/MOTRIN   Take 1-2 tablets (400-800 mg) by mouth every 6 hours as needed for other (cramping)   Last time this was given:  800 mg on 9/16/2017  8:06 AM                                NO ACTIVE MEDICATIONS

## 2017-09-14 NOTE — IP AVS SNAPSHOT
[facility]    [address] [place] [address], [address]    [place] [address]    Phone:  [phone]                                       After Visit Summary   9/14/2017    Lashell Munguia    MRN: [id number]           After Visit Summary Signature Page     I have received my discharge instructions, and my questions have been answered. I have discussed any challenges I see with this plan with the nurse or doctor.    ..........................................................................................................................................  Patient/Patient Representative Signature      ..........................................................................................................................................  Patient Representative Print Name and Relationship to Patient    ..................................................               ................................................  Date                                            Time    ..........................................................................................................................................  Reviewed by Signature/Title    ...................................................              ..............................................  Date                                                            Time

## 2017-09-14 NOTE — H&P
"OB Brief Admit H&P    No significant change in general health status based on examination of the patient, review of Nursing Admission Database and prenatal record.    Pt is a 22 year old  @ 38w6d who presented to L&D with labor.    Patient's prenatal course has been complicated by late prenatal care at [facility]  Unassigned patient.  Prenatal care at [facility] starting at 20 weeks.  Chart reviewed under chart everywhere  Normal anatomy sono at 22 weeks  Rh neg and received rhogam  Previous full tern delivery, uncomplicated  Sickle cell trait, FOB unknown.  Same FOB as first pregnancy    Prenatal Labs:    Blood type   Oneg  Rubella immune  GCT80  GBS pos  Heb B neg  HIV neg    EFW: 7lbs    /63  Temp 98.1  F (36.7  C) (Oral)  Resp 18  Ht 1.676 m (5' 6\")  Wt 78.9 kg (174 lb)  LMP 2016  BMI 28.08 kg/m2  EFM:  Category 1  [place]: q3-4min on pitocin  SVE: 6/70%/-1  Membranes:  arom clear    Assessment:  22 year old  @ 38w6d admitted for labor    Plan:  1. Admit to labor and delivery   2. Anticipate   3. Epidural PRN    Charlotte Junior  2017  9:13 AM      "

## 2017-09-14 NOTE — ANESTHESIA PROCEDURE NOTES
Peripheral nerve/Neuraxial procedure note : epidural catheter  Pre-Procedure  Performed by NAYAN CAMARGO  Location: OB      Pre-Anesthestic Checklist: patient identified, IV checked, risks and benefits discussed, informed consent, monitors and equipment checked, pre-op evaluation and at physician/surgeon's request    Timeout  Correct Patient: Yes   Correct Procedure: Yes   Correct Site: Yes   Correct Laterality: N/A   Correct Position: Yes   Site Marked: N/A   .   Procedure Documentation    .    Procedure:    Epidural catheter.  Insertion Site:L3-4  (midline approach) Injection technique: LORT saline   Local skin infiltrated with 3 mL of 1% lidocaine.  SHELDON at 4 cm     Patient Prep;mask, sterile gloves, povidone-iodine 7.5% surgical scrub, patient draped.  .  Needle: ToInteligisticsy needle Needle Gauge: 17.    Needle Length (Inches) 3.5  # of attempts: 1 and # of redirects: : 0. .   Catheter: 19 G . .  Catheter threaded easily  5 cm epidural space.  9 cm at skin.   .    Assessment/Narrative  Paresthesias: No.  .  .  Aspiration negative for heme or CSF  . Test dose of 3 mL lidocaine 1.5% w/ 1:200,000 epinephrine at. Test dose negative for signs of intravascular, subdural or intrathecal injection. Comments:  Pt tolerated well.   Immediately to supine with TELMA.   FHTs stable post-procedure.   No complications.

## 2017-09-14 NOTE — PLAN OF CARE
0150 Multip 38+6 admitted to room 218. Patient consents to monitors, monitors applied. IV started and antibiotics started for GBS+ status.   0220 Patient requesting to walk at this time. Monitors off.  0510 Patient requesting SVE. Contractions slightly more uncomfortable than on admission. SVE 5/70/-2.   0725 Dr. Morales called for update. Updated on SVE, contraction pattern, and labor status. Plan to start pitocin and Dr. Junior will round on patient/AROM later this morning. Report given to Kina VILLA RN to assume cares at this time.

## 2017-09-14 NOTE — PLAN OF CARE
Problem: Labor (Cervical Ripen, Induct, Augment) (Adult,Obstetrics,Pediatric)  Goal: Signs and Symptoms of Listed Potential Problems Will be Absent or Manageable (Labor)  Signs and symptoms of listed potential problems will be absent or manageable by discharge/transition of care (reference Labor (Cervical Ripen, Induct, Augment) (Adult,Obstetrics,Pediatric) CPG).   Outcome: Completed Date Met:  09/14/17  Lashell required pitocin/ AROM for augmentation of labor and made quick progress to complete dilation at 1032. SVE, viable female infant 7-11oz apgars 9,9.

## 2017-09-14 NOTE — PLAN OF CARE
Data: Patient presented to [facility] at 0106.   Reason for maternal/fetal assessment per patient is Contractions  Patient is a . Prenatal record reviewed.   Gestational Age 38w6d. VSS. Fetal movement present. Patient states she started sunshine around 10pm and now they are 5 minutes apart and getting stronger. Patient denies vaginal discharge, pelvic pressure, UTI symptoms, GI problems, bloody show, vaginal bleeding, edema, headache, visual disturbances, epigastric or URQ pain, abdominal pain, rupture of membranes. Support person present.  Action: Verbal consent for EFM. Triage assessment completed. EFM applied for fetal wellbeing. Uterine assessment completed; contractions noted about every 4-6 minutes, mild. Fetal assessment: Presumed adequate fetal oxygenation documented (see flow record).   Response: Dr. Morales informed of patient arrival, brief history, SVE at 5cm, GBS+, contractions, and FHR tracing. Plan per provider is admit to L&D, give PCN for GBS, and may have pain meds/epidural as needed. Patient verbalized agreement with plan. Patient transferred to room 218 ambulatory, oriented to room and call light. Report given to Lizette KNUTSON RN.

## 2017-09-15 LAB — HGB BLD-MCNC: 9.1 G/DL (ref 11.7–15.7)

## 2017-09-15 PROCEDURE — 25000132 ZZH RX MED GY IP 250 OP 250 PS 637: Performed by: OBSTETRICS & GYNECOLOGY

## 2017-09-15 PROCEDURE — 85018 HEMOGLOBIN: CPT | Performed by: OBSTETRICS & GYNECOLOGY

## 2017-09-15 PROCEDURE — 86900 BLOOD TYPING SEROLOGIC ABO: CPT | Performed by: OBSTETRICS & GYNECOLOGY

## 2017-09-15 PROCEDURE — 12000037 ZZH R&B POSTPARTUM INTERMEDIATE

## 2017-09-15 PROCEDURE — 86901 BLOOD TYPING SEROLOGIC RH(D): CPT | Performed by: OBSTETRICS & GYNECOLOGY

## 2017-09-15 PROCEDURE — 85461 HEMOGLOBIN FETAL: CPT | Performed by: OBSTETRICS & GYNECOLOGY

## 2017-09-15 PROCEDURE — 36415 COLL VENOUS BLD VENIPUNCTURE: CPT | Performed by: OBSTETRICS & GYNECOLOGY

## 2017-09-15 RX ADMIN — SENNOSIDES AND DOCUSATE SODIUM 1 TABLET: 8.6; 5 TABLET ORAL at 17:25

## 2017-09-15 RX ADMIN — IBUPROFEN 800 MG: 400 TABLET ORAL at 17:24

## 2017-09-15 RX ADMIN — IBUPROFEN 800 MG: 400 TABLET ORAL at 01:59

## 2017-09-15 RX ADMIN — ACETAMINOPHEN 650 MG: 325 TABLET, FILM COATED ORAL at 19:38

## 2017-09-15 RX ADMIN — SENNOSIDES AND DOCUSATE SODIUM 1 TABLET: 8.6; 5 TABLET ORAL at 10:24

## 2017-09-15 RX ADMIN — IBUPROFEN 800 MG: 400 TABLET ORAL at 10:24

## 2017-09-15 NOTE — PLAN OF CARE
Problem: Goal Outcome Summary  Goal: Goal Outcome Summary  Outcome: No Change  VSS. Ambulating independently in room. I/O adequate. Ibuprofen for pain control with relief. Independent with self and infant cares. Breastfeeding going well. Patient declined Tdap overnight.

## 2017-09-15 NOTE — LACTATION NOTE
Initial visit.   Breastfeeding handout given.   Advised to breastfeed exclusively, on demand, avoid pacifiers, bottles and formula unless medically indicated.  Encouraged rooming in, skin to skin, feeding on demand 8-12x/day or sooner if baby cues.  Explained benefits of holding and skin to skin.  Encouraged lots of skin to skin. Will be picking up her pump in [place].  Instructed on hand expression.   Continues to nurse well per mom. No further questions at this time. Using nu-gel for sore/blister nipples.  No blister seen on te right breast, patient states she has one on left, declined to show LC. Outpatient resource phone numbers given.   Will follow as needed.   Gina Pope RNC, IBCLC

## 2017-09-15 NOTE — L&D DELIVERY NOTE
DATE OF DELIVERY:  2017      PRENATAL COURSE:  Muriel Espinosa is a 22-year-old,  2, para 1-0-0-1, admitted at 38 and 6 weeks gestation in active labor.  The patient had her prenatal care at [facility] starting at 20 weeks gestation, and per review of the records as available, there appeared to be an uneventful prenatal course.  She had an anatomy scan at 20 weeks that was normal.  She is Rh negative and received RhoGAM.  She is sickle cell trait positive.  Father of baby is .  Her blood type is O negative, rubella immune, glucose screen normal at 80, positive GBS, hepatitis B negative, HIV negative, VDRL negative.      LABOR:  At the time of admission, the patient was 5 cm dilated, 70% effaced, and sunshine irregularly.  Due to her group B strep status, penicillin was started.  She received three doses prior to delivery.  First stage of labor required Pitocin augmentation up to 7 milliunits.  Artificial rupture of membranes produced clear fluid.  External fetal heart rate monitoring throughout the labor showed a good fetal heart rate with accelerations, occasional decelerations in the second stage of labor.  She received epidural for pain relief with good results.      DELIVERY:  On 2017, at 11:17 a.m., the patient delivered a healthy female, weight pending at the time of this delivery.  No episiotomy was performed and there were no lacerations.      Placenta followed spontaneously, was intact and had a 3-vessel cord.  Pitocin was running IV following delivery of the placenta.  Apgars were 9 at 1 minute and 9 at 5 minutes.  Estimated blood loss was 200 cc.      Mother and infant were stable following delivery.         MARA AMEZCUA MD             D: 2017 11:42   T: 09/15/2017 02:14   MT: CLOVER#[id number]      Name:     MURIEL ESPINOSA   MRN:      [id number]        Account:        [id number]   :      1995           Delivery Date:  2017      Document: [id number]        cc: [facility]

## 2017-09-15 NOTE — PLAN OF CARE
Problem: Postpartum, Vaginal Delivery (Adult)  Goal: Signs and Symptoms of Listed Potential Problems Will be Absent or Manageable (Postpartum, Vaginal Delivery)  Signs and symptoms of listed potential problems will be absent or manageable by discharge/transition of care (reference Postpartum, Vaginal Delivery (Adult) CPG).   Outcome: Improving   Ibuprofen adequate for pain relief. Pt independent with self and infant cares.

## 2017-09-15 NOTE — PROGRESS NOTES
"OB Post-partum Note  PPD# 1    S:  Patient doing well.  Pain controlled.  Voiding.  Bleeding min.  Breast feeding.    O:  /61  Temp 98.6  F (37  C) (Oral)  Resp 16  Ht 1.676 m (5' 6\")  Wt 78.9 kg (174 lb)  LMP 2016  SpO2 97%  Breastfeeding? Unknown  BMI 28.08 kg/m2  Gen- A&O, NAD  Abd- Non-tender, fundus firm at umbilicus  Ext- non-tender, no edema    Hemoglobin   Date Value Ref Range Status   2017 10.2 (L) 11.7 - 15.7 g/dL Final     O  Rubella Immune    A/P: 22 year old  PPD# 1 s/p     1.  Routine post-partum cares.  2.may d/c home this afternoon if all good with the baby otherwise PPD #2      Damian Morales  9/15/2017  9:06 AM    "

## 2017-09-15 NOTE — ANESTHESIA POSTPROCEDURE EVALUATION
Patient: Lashell Munguia    * No procedures listed *    Diagnosis:* No pre-op diagnosis entered *  Diagnosis Additional Information: No value filed.    Anesthesia Type:  No value filed.    Note:  Anesthesia Post Evaluation    Patient location during evaluation: bedside  Patient participation: Able to fully participate in evaluation  Level of consciousness: awake  Pain management: adequate  Airway patency: patent  Cardiovascular status: acceptable  Respiratory status: acceptable  Hydration status: acceptable  PONV: none     Anesthetic complications: None    Comments: No anesthetic complications noted. Denies HA. Motor/Sensation intact bilaterally. Mild soreness at insertion site. Pleased with epidural management.         Last vitals:  Vitals:    09/15/17 0159 09/15/17 0240 09/15/17 1000   BP:   121/67   Resp: 16 16 16   Temp:   36.8  C (98.3  F)   SpO2:            Electronically Signed By: Omega Villa DO, DO  September 15, 2017  4:03 PM

## 2017-09-16 VITALS
OXYGEN SATURATION: 97 % | TEMPERATURE: 98.2 F | DIASTOLIC BLOOD PRESSURE: 64 MMHG | BODY MASS INDEX: 27.97 KG/M2 | HEIGHT: 66 IN | WEIGHT: 174 LBS | SYSTOLIC BLOOD PRESSURE: 111 MMHG | RESPIRATION RATE: 16 BRPM

## 2017-09-16 PROCEDURE — 25000128 H RX IP 250 OP 636: Performed by: OBSTETRICS & GYNECOLOGY

## 2017-09-16 PROCEDURE — 25000132 ZZH RX MED GY IP 250 OP 250 PS 637: Performed by: OBSTETRICS & GYNECOLOGY

## 2017-09-16 RX ORDER — IBUPROFEN 400 MG/1
400-800 TABLET, FILM COATED ORAL EVERY 6 HOURS PRN
Qty: 40 TABLET | Refills: 1 | Status: SHIPPED | OUTPATIENT
Start: 2017-09-16 | End: 2020-11-12

## 2017-09-16 RX ADMIN — ACETAMINOPHEN 650 MG: 325 TABLET, FILM COATED ORAL at 08:05

## 2017-09-16 RX ADMIN — IBUPROFEN 800 MG: 400 TABLET ORAL at 08:06

## 2017-09-16 RX ADMIN — IBUPROFEN 400 MG: 400 TABLET ORAL at 00:14

## 2017-09-16 RX ADMIN — ACETAMINOPHEN 650 MG: 325 TABLET, FILM COATED ORAL at 00:13

## 2017-09-16 RX ADMIN — HUMAN RHO(D) IMMUNE GLOBULIN 300 MCG: 300 INJECTION, SOLUTION INTRAMUSCULAR at 08:45

## 2017-09-16 NOTE — PROGRESS NOTES
"OB Post-partum Note  PPD# 2    S:  Patient doing well.  Pain controlled.  Voiding.  Bleeding is normal.  Breast feeding.  The patient is anxious for discharge.     O:  /64  Temp 98.2  F (36.8  C) (Oral)  Resp 16  Ht 1.676 m (5' 6\")  Wt 78.9 kg (174 lb)  LMP 2016  SpO2 97%  Breastfeeding? Unknown  BMI 28.08 kg/m2  Gen- A&O, NAD  Abd- Non-tender, fundus firm at umbilicus  Perineum intact, healing well  Ext- non-tender, no edema, no leg or calf pain    Hemoglobin   Date Value Ref Range Status   09/15/2017 9.1 (L) 11.7 - 15.7 g/dL Final     O negative  Rubella Immune    A/P: 22 year old  PPD# 2 s/p .  Normal post partum course without apparent complications.      1.  Routine post-partum cares  2.  Analgesia, Rx for Ibuprofen requested.  3.  Discharge today  4.  The plan of care was discussed with the patient.  She expressed understanding and agreement  5.  RTC in 6 weeks  6.  Indications to call or return were discussed. Post partum instructions were given  7.  Rhogam if a candidate.       Aiden Rowell  2017  9:40 AM  "

## 2017-09-16 NOTE — PLAN OF CARE
Problem: Goal Outcome Summary  Goal: Goal Outcome Summary  Outcome: Improving  Pt VSS, taking PO medication for pain control.  Voiding without difficulty.  Assisting with feedings as needed.  Will continue to monitor.

## 2017-09-16 NOTE — PLAN OF CARE
Problem: Goal Outcome Summary  Goal: Goal Outcome Summary  Outcome: Improving  Pt on pathway. Up ad diana. Taking tylenol and ibuprofen for pain. Baby is breastfeeding and bottle feeding pumped BM if interested.

## 2017-09-16 NOTE — PLAN OF CARE
Patient up independently, voiding without difficulty, taking ibuprofen and tylenol for pain with relief. Ready for discharge. Discharge instructions reviewed with patient, patient verbalized understanding of self and infant care and follow-up needs.

## 2017-09-17 LAB
ABO + RH BLD: NORMAL
ABO + RH BLD: NORMAL
BLOOD BANK CMNT PATIENT-IMP: NORMAL
DATE RH IMM GL GVN: NORMAL
FETAL CELL SCN BLD QL ROSETTE: NORMAL
RH IG VIALS RECOM PATIENT: NORMAL

## 2017-09-21 ENCOUNTER — NURSE TRIAGE (OUTPATIENT)
Dept: NURSING | Facility: CLINIC | Age: 22
End: 2017-09-21

## 2017-09-21 ENCOUNTER — HOSPITAL ENCOUNTER (EMERGENCY)
Facility: CLINIC | Age: 22
Discharge: HOME | End: 2017-09-21
Attending: EMERGENCY MEDICINE | Admitting: EMERGENCY MEDICINE
Payer: COMMERCIAL

## 2017-09-21 VITALS
TEMPERATURE: 98.4 F | HEART RATE: 72 BPM | RESPIRATION RATE: 16 BRPM | SYSTOLIC BLOOD PRESSURE: 119 MMHG | OXYGEN SATURATION: 98 % | DIASTOLIC BLOOD PRESSURE: 67 MMHG

## 2017-09-21 DIAGNOSIS — N93.9 VAGINAL BLEEDING: ICD-10-CM

## 2017-09-21 LAB
ERYTHROCYTE [DISTWIDTH] IN BLOOD BY AUTOMATED COUNT: 13.6 % (ref 10–15)
HCT VFR BLD AUTO: 30 % (ref 35–47)
HGB BLD-MCNC: 10.4 G/DL (ref 11.7–15.7)
MCH RBC QN AUTO: 26.8 PG (ref 26.5–33)
MCHC RBC AUTO-ENTMCNC: 34.7 G/DL (ref 31.5–36.5)
MCV RBC AUTO: 77 FL (ref 78–100)
PLATELET # BLD AUTO: 374 10E9/L (ref 150–450)
RBC # BLD AUTO: 3.88 10E12/L (ref 3.8–5.2)
WBC # BLD AUTO: 8.3 10E9/L (ref 4–11)

## 2017-09-21 PROCEDURE — 99283 EMERGENCY DEPT VISIT LOW MDM: CPT

## 2017-09-21 PROCEDURE — 85027 COMPLETE CBC AUTOMATED: CPT | Performed by: EMERGENCY MEDICINE

## 2017-09-21 ASSESSMENT — ENCOUNTER SYMPTOMS
DYSURIA: 0
VOMITING: 0
CONSTIPATION: 0
FREQUENCY: 0
SHORTNESS OF BREATH: 0

## 2017-09-21 NOTE — ED AVS SNAPSHOT
Emergency Department    [address] [place] [address]    Phone:  [phone]    Fax:  [phone]                                       Lashell Munguia   MRN: [id number]    Department:   Emergency Department   Date of Visit:  9/21/2017           After Visit Summary Signature Page     I have received my discharge instructions, and my questions have been answered. I have discussed any challenges I see with this plan with the nurse or doctor.    ..........................................................................................................................................  Patient/Patient Representative Signature      ..........................................................................................................................................  Patient Representative Print Name and Relationship to Patient    ..................................................               ................................................  Date                                            Time    ..........................................................................................................................................  Reviewed by Signature/Title    ...................................................              ..............................................  Date                                                            Time

## 2017-09-21 NOTE — DISCHARGE INSTRUCTIONS
Continue to monitor your bleeding  Concerning symptoms would be bleeding through a pad in one hour and if this occurs, recommend calling the OB clinic  Make sure to use stool softeners or prune juice to help prevent constipation

## 2017-09-21 NOTE — TELEPHONE ENCOUNTER
"Patient calling reporting she had delivered her baby on 9/14/17. Vaginal delivery. Reporting today passing a  \"golf ball\" size clot.  Stating she was advised to be seen if she passed clot greater then golf ball in size. Abdominal cramping intermittent. Afebrile.  Denies further increase in vaginal bleeding. Advised to contact OB/GYN today for office appointment. To be seen in ED for any increase or change in symptoms.  Patient verbalized understanding. OB/GYN through [facility].    Reason for Disposition    [1] Passing blood clots  AND [2] persists > 4 days postpartum    Additional Information    Negative: Shock suspected (e.g., cold/pale/clammy skin, too weak to stand, low BP, rapid pulse)    Negative: Difficult to awaken or acting confused  (e.g., disoriented, slurred speech)    Negative: Passed out (i.e., lost consciousness, collapsed and was not responding)    Negative: Sounds like a life-threatening emergency to the triager    Negative: SEVERE dizziness (e.g., unable to stand, requires support to walk, feels like passing out now)    Negative: [1] SEVERE abdominal pain AND [2] present > 1 hour    Negative: Fever > 100.4 F (38.0 C)    Negative: SEVERE vaginal bleeding (i.e., soaking 2 pads or tampons per hour and present 2 or more hours)    Negative: Patient sounds very sick or weak to the triager    Negative: MODERATE vaginal bleeding (i.e., soaking 1 pad or tampon per hour and present > 6 hours)    Negative: [1] Constant abdominal pain AND [2] present > 2 hours    Negative: Pale skin (pallor) of new onset or worsening    Negative: Foul smelling vaginal discharge (i.e., lochia)    Negative: Bleeding with > 6 soaked pads per day    Protocols used: POSTPARTUM - VAGINAL BLEEDING AND LOCHIA-ADULT-AH    "

## 2017-09-21 NOTE — ED AVS SNAPSHOT
Emergency Department    [address] [facility] [address]    Phone:  [phone]    Fax:  [phone]                                       Lashell Munguia   MRN: [id number]    Department:   Emergency Department   Date of Visit:  9/21/2017           Patient Information     Date Of Birth          1995        Your diagnoses for this visit were:     Vaginal bleeding        You were seen by Letitia Malloy MD.      Follow-up Information     Call Charlotte Junior MD.    Specialty:  OB/Gyn    Why:  As needed    Contact information:    [facility]  [address] [facility] [address]  [facility] [address] [phone]          Follow up with  Emergency Department.    Specialty:  EMERGENCY MEDICINE    Why:  If symptoms worsen    Contact information:    [address] [facility] [address] [phone]        Discharge Instructions       Continue to monitor your bleeding  Concerning symptoms would be bleeding through a pad in one hour and if this occurs, recommend calling the OB clinic  Make sure to use stool softeners or prune juice to help prevent constipation    24 Hour Appointment Hotline       To make an appointment at any [facility], call [phone] ([phone]). If you don't have a family doctor or clinic, we will help you find one. [facility] clinics are conveniently located to serve the needs of you and your family.             Review of your medicines      Our records show that you are taking the medicines listed below. If these are incorrect, please call your family doctor or clinic.        Dose / Directions Last dose taken    ibuprofen 400 MG tablet   Commonly known as:  ADVIL/MOTRIN   Dose:  400-800 mg   Quantity:  40 tablet        Take 1-2 tablets (400-800 mg) by mouth every 6 hours as needed for other (cramping)   Refills:  1        NO ACTIVE MEDICATIONS        Refills:  0                Procedures and tests performed during your visit      CBC (+ platelets, no diff)      Orders Needing Specimen Collection     None      Pending Results     No orders found from 9/19/2017 to 9/22/2017.            Pending Culture Results     No orders found from 9/19/2017 to 9/22/2017.            Pending Results Instructions     If you had any lab results that were not finalized at the time of your Discharge, you can call the ED Lab Result RN at [phone]. You will be contacted by this team for any positive Lab results or changes in treatment. The nurses are available 7 days a week from 10A to 6:30P.  You can leave a message 24 hours per day and they will return your call.        Test Results From Your Hospital Stay        9/21/2017  3:37 PM      Component Results     Component Value Ref Range & Units Status    WBC 8.3 4.0 - 11.0 10e9/L Final    RBC Count 3.88 3.8 - 5.2 10e12/L Final    Hemoglobin 10.4 (L) 11.7 - 15.7 g/dL Final    Hematocrit 30.0 (L) 35.0 - 47.0 % Final    MCV 77 (L) 78 - 100 fl Final    MCH 26.8 26.5 - 33.0 pg Final    MCHC 34.7 31.5 - 36.5 g/dL Final    RDW 13.6 10.0 - 15.0 % Final    Platelet Count 374 150 - 450 10e9/L Final                Clinical Quality Measure: Blood Pressure Screening     Your blood pressure was checked while you were in the emergency department today. The last reading we obtained was  BP: 119/67 . Please read the guidelines below about what these numbers mean and what you should do about them.  If your systolic blood pressure (the top number) is less than 120 and your diastolic blood pressure (the bottom number) is less than 80, then your blood pressure is normal. There is nothing more that you need to do about it.  If your systolic blood pressure (the top number) is 120-139 or your diastolic blood pressure (the bottom number) is 80-89, your blood pressure may be higher than it should be. You should have your blood pressure rechecked within a year by a primary care provider.  If your systolic blood pressure (the top number)  "is 140 or greater or your diastolic blood pressure (the bottom number) is 90 or greater, you may have high blood pressure. High blood pressure is treatable, but if left untreated over time it can put you at risk for heart attack, stroke, or kidney failure. You should have your blood pressure rechecked by a primary care provider within the next 4 weeks.  If your provider in the emergency department today gave you specific instructions to follow-up with your doctor or provider even sooner than that, you should follow that instruction and not wait for up to 4 weeks for your follow-up visit.        Thank you for choosing [facility]       Thank you for choosing [facility] for your care. Our goal is always to provide you with excellent care. Hearing back from our patients is one way we can continue to improve our services. Please take a few minutes to complete the written survey that you may receive in the mail after you visit with us. Thank you!        [facility] Information     Stalkthis lets you send messages to your doctor, view your test results, renew your prescriptions, schedule appointments and more. To sign up, go to [email or web address] . Click on \"Log in\" on the left side of the screen, which will take you to the Welcome page. Then click on \"Sign up Now\" on the right side of the page.     You will be asked to enter the access code listed below, as well as some personal information. Please follow the directions to create your username and password.     Your access code is: [email or web address]  Expires: 12/15/2017 10:05 AM     Your access code will  in 90 days. If you need help or a new code, please call your [facility] clinic or [phone].        Care EveryWhere ID     This is your Care EveryWhere ID. This could be used by other organizations to access your [facility] medical records  [id number]        Equal Access to Services     KANDACE JONES AH: arya Ann, kenny small " ravi jernigan ah. So [facility] [phone].    ATENCIÓN: Si habla español, tiene a scruggs disposición servicios gratuitos de asistencia lingüística. Llame al [phone].    We comply with applicable federal civil rights laws and [place] laws. We do not discriminate on the basis of race, color, national origin, age, disability sex, sexual orientation or gender identity.            After Visit Summary       This is your record. Keep this with you and show to your community pharmacist(s) and doctor(s) at your next visit.

## 2017-09-21 NOTE — ED PROVIDER NOTES
History     Chief Complaint:  Vaginal Bleeding    HPI   Lashell Munguia is a 22 year old female, 7 days post partum, with uncomplicated pregnancy, who presents with new born baby to the emergency department today for evaluation of vaginal bleeding. The patient states that she has been experiencing light bleeding since pregnancy, filling a pad every 1/2 day. However, today around 1100 she passed a long, thick, golf-ball sized clot. She states that since then she has had some light bleeding, passing no additional clots. The patient additionally complains of some pain wrapping around both sides of her low back that began when she got home from the hospital and is exacerbated by movement or position changes. The patient denies any changes in bladder habits such as urgency, frequency or dysuria. She additionally denies any vomiting, shortness of breath, leg pain, leg swelling. She notes that she had some constipation and has treated this with stool softeners, though is no longer taking these.      Allergies:  No Known Drug Allergies     Medications:    Ibuprofen     Past Medical History:    Anemia  Chlamydia Infection  Excessive Sweating, local  Migraine with Aura    Past Surgical History:    History reviewed.  No significant past surgical history.     Family History:    Maternal Grandmother and Maternal Aunt positive for Hypertension     Social History:  The patient was accompanied to the ED by her  baby.  Smoking Status: negative  Smokeless Tobacco: negative  Alcohol Use: negative  Marital Status:  Single [1]     Review of Systems   Respiratory: Negative for shortness of breath.    Cardiovascular: Positive for chest pain (Localized to ribs).   Gastrointestinal: Negative for constipation and vomiting.   Genitourinary: Positive for vaginal discharge. Negative for dysuria, frequency and urgency.   All other systems reviewed and are negative.      Physical Exam   First Vitals:  Patient Vitals for the past 24  hrs:   BP Temp Heart Rate Resp SpO2   09/21/17 1506 119/67 98.4  F (36.9  C) 70 18 99 %     Physical Exam  General: Resting comfortably on the gurney  Eyes:  The pupils are equal and round  ENT:    Moist mucous membranes  Neck:  Normal range of motion  CV:  Regular rate and rhythm    Skin warm and well perfused   Resp:  Lungs are clear    Non-labored    No rales    No wheezing   GI:  Abdomen is soft, there is no rigidity    No distension    No rebound tenderness   MS:  Normal muscular tone    No back pain  Skin:  No rash or acute skin lesions noted  Neuro:   Awake, alert.      Speech is normal and fluent.    Face is symmetric.     Moves all extremities  Psych: Normal affect.  Appropriate interactions.    Emergency Department Course   Laboratory:  CBC: HGB 10.4 (L), HCT 30.0 (L), MCV 77 (L) o/w WNL. (WBC 8.3, )     Emergency Department Course:  Nursing notes and vitals reviewed. I performed an exam of the patient as documented above.     Blood drawn. This was sent to the lab for further testing, results above.    1602 I consulted with Dr. Junior, the patient's OB/GYN.     1605 I reevaluated the patient and provided an update in regards to her ED course.      Findings and plan explained to the Patient. Patient discharged home with instructions regarding supportive care, medications, and reasons to return. The importance of close follow-up was reviewed.     Impression & Plan    Medical Decision Making:  Lashell Munguia is a 22 year old female who presented to the emergency department with vaginal bleeding. No abdominal tenderness on exam. Hemoglobin is improved from when she was in hospital. She has minimal bleeding at this time. I spoke with Dr. Junior who delivered the baby, and she discussed that this sounds normal and expected. No abdominal tenderness to suggest endometritis, retained products. No indication for ultrasound at this time. Mentioned several other things in passing such as some intermittent  back pain that comes on with movement and constipation .Based on her other symptoms, no evidence of UTI, pyelonephritis, PE, dissection. Reasons to return to the emergency department were discussed with the patient.     Diagnosis:    ICD-10-CM    1. Vaginal bleeding N93.9        Disposition:  Discharged to home.     Joleen BARKLEY, am serving as a scribe on 9/21/2017 at 3:20 PM to personally document services performed by Letitia Malloy MD based on my observations and the provider's statements to me.     Joleen Tirado  9/21/2017    EMERGENCY DEPARTMENT       Letitia Malloy MD  09/22/17 0023

## 2019-03-27 ENCOUNTER — APPOINTMENT (OUTPATIENT)
Dept: ULTRASOUND IMAGING | Facility: CLINIC | Age: 24
End: 2019-03-27
Attending: EMERGENCY MEDICINE
Payer: COMMERCIAL

## 2019-03-27 ENCOUNTER — HOSPITAL ENCOUNTER (EMERGENCY)
Facility: CLINIC | Age: 24
Discharge: HOME | End: 2019-03-27
Attending: EMERGENCY MEDICINE | Admitting: EMERGENCY MEDICINE
Payer: COMMERCIAL

## 2019-03-27 VITALS
BODY MASS INDEX: 28.97 KG/M2 | SYSTOLIC BLOOD PRESSURE: 136 MMHG | RESPIRATION RATE: 16 BRPM | OXYGEN SATURATION: 99 % | HEART RATE: 70 BPM | DIASTOLIC BLOOD PRESSURE: 73 MMHG | HEIGHT: 67 IN | WEIGHT: 184.6 LBS | TEMPERATURE: 98.3 F

## 2019-03-27 DIAGNOSIS — R10.2 PELVIC PAIN IN FEMALE: ICD-10-CM

## 2019-03-27 DIAGNOSIS — N94.10 DYSPAREUNIA IN FEMALE: ICD-10-CM

## 2019-03-27 LAB
ALBUMIN UR-MCNC: 10 MG/DL
APPEARANCE UR: CLEAR
BILIRUB UR QL STRIP: NEGATIVE
COLOR UR AUTO: YELLOW
GLUCOSE UR STRIP-MCNC: NEGATIVE MG/DL
HCG UR QL: NEGATIVE
HGB UR QL STRIP: ABNORMAL
KETONES UR STRIP-MCNC: NEGATIVE MG/DL
LEUKOCYTE ESTERASE UR QL STRIP: ABNORMAL
NITRATE UR QL: NEGATIVE
PH UR STRIP: 5.5 PH (ref 5–7)
RBC #/AREA URNS AUTO: >182 /HPF (ref 0–2)
SOURCE: ABNORMAL
SP GR UR STRIP: 1.01 (ref 1–1.03)
SQUAMOUS #/AREA URNS AUTO: 2 /HPF (ref 0–1)
UROBILINOGEN UR STRIP-MCNC: NORMAL MG/DL (ref 0–2)
WBC #/AREA URNS AUTO: 0 /HPF (ref 0–5)

## 2019-03-27 PROCEDURE — 81001 URINALYSIS AUTO W/SCOPE: CPT | Performed by: EMERGENCY MEDICINE

## 2019-03-27 PROCEDURE — 81025 URINE PREGNANCY TEST: CPT | Performed by: EMERGENCY MEDICINE

## 2019-03-27 PROCEDURE — 99284 EMERGENCY DEPT VISIT MOD MDM: CPT | Mod: 25

## 2019-03-27 PROCEDURE — 93976 VASCULAR STUDY: CPT | Mod: XS

## 2019-03-27 ASSESSMENT — ENCOUNTER SYMPTOMS
FREQUENCY: 0
ABDOMINAL PAIN: 1
BACK PAIN: 0
BLOOD IN STOOL: 0
FEVER: 0
FLANK PAIN: 0
HEMATURIA: 0
NAUSEA: 0
CHILLS: 0
CONSTIPATION: 0
DIARRHEA: 0
VOMITING: 0
DYSURIA: 0
DIAPHORESIS: 0
DIFFICULTY URINATING: 0

## 2019-03-27 ASSESSMENT — MIFFLIN-ST. JEOR: SCORE: 1624.97

## 2019-03-27 NOTE — ED AVS SNAPSHOT
Emergency Department  [address] [place] [address]  Phone:  [phone]  Fax:  [phone]                                    Lashell Munguia   MRN: [id number]    Department:   Emergency Department   Date of Visit:  3/27/2019           After Visit Summary Signature Page    I have received my discharge instructions, and my questions have been answered. I have discussed any challenges I see with this plan with the nurse or doctor.    ..........................................................................................................................................  Patient/Patient Representative Signature      ..........................................................................................................................................  Patient Representative Print Name and Relationship to Patient    ..................................................               ................................................  Date                                   Time    ..........................................................................................................................................  Reviewed by Signature/Title    ...................................................              ..............................................  Date                                               Time          22EPIC Rev 08/18

## 2019-03-27 NOTE — LETTER
March 27, 2019      To Whom It May Concern:      Lashell Munguia was seen in our Emergency Department today, 03/27/19.  I expect her condition to improve over the next few days days.  She may return to work on 3/29/2019.    Sincerely,        Kan Turner RN

## 2019-03-27 NOTE — LETTER
March 27, 2019      To Whom It May Concern:      Lashell Munguia was seen in our Emergency Department today, 03/27/19.  I expect her condition to improve over the next 2 - 3 days.  She may return to work when improved.    Sincerely,        Chad A. Trierweiler, MD

## 2019-03-28 NOTE — ED PROVIDER NOTES
History     Chief Complaint:  Pelvic Pain     HPI   Lashell Munguia is an otherwise healthy 24 year old female who presents to the emergency department this evening with concern for lower abdominal and pelvic pain. She reports that she initially developed this pain 4 days ago after intercourse. This pain is in her lower abdomen and pelvic region and has since been constant and progressive in nature with increased pain with intercourse, ambulation, sitting or standing. She denies urinary changes, flank pain, constipation or diarrhea and denies recent change in sexual partner. The patient started her period today but does not feel like this pain is typical to her period pains. She denies vaginal discharge and is not nauseous or vomiting. No fevers. 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 The patient has not had the sense that there is a bulge present in her abdomen and denies increased pain with bending. She denies any further concerns or symptoms otherwise.     Allergies:  No known drug allergies     Medications:    The patient is not currently taking any prescribed medications.     Past Medical History:    Anemia   Chlamydia   Migraines     Past Surgical History:    History reviewed. No pertinent surgical history.     Family History:    Hypertension     Social History:  The patient was not accompanied to the ED.  Smoking Status: Never  Smokeless Tobacco: Never  Alcohol Use: No   Marital Status:  Single [1]     Review of Systems   Constitutional: Negative for chills, diaphoresis and fever.   Gastrointestinal: Positive for abdominal pain. Negative for blood in stool, constipation, diarrhea, nausea and vomiting.   Genitourinary: Positive for pelvic pain and vaginal bleeding. Negative for difficulty urinating, dysuria, flank pain, frequency, hematuria and vaginal discharge.  "  Musculoskeletal: Negative for back pain.   All other systems reviewed and are negative.      Physical Exam     Patient Vitals for the past 24 hrs:   BP Temp Temp src Pulse Resp SpO2 Height Weight   03/27/19 2030 136/73 98.3  F (36.8  C) Oral 70 16 99 % 1.702 m (5' 7\") 83.7 kg (184 lb 9.6 oz)     Physical Exam  Eye:  Pupils are equal, round, and reactive.  Extraocular movements intact.    ENT:  No rhinorrhea.  Moist mucus membranes.  Normal tongue and tonsil.    Cardiac:  Regular rate and rhythm.  No murmurs, gallops, or rubs.    Pulmonary:  Clear to auscultation bilaterally.  No wheezes, rales, or rhonchi.    Abdomen:  Focal tenderness solely in the suprapubic area without rebound or guarding.     Musculoskeletal:  Normal movement of all extremities without evidence for deficit.    Skin:  Warm and dry without rashes.    Neurologic:  Non-focal exam without asymmetric weakness or numbness.     Psychiatric:  Normal affect with appropriate interaction with examiner.    Emergency Department Course     Imaging:  Radiology findings were communicated with the patient who voiced understanding of the findings.    US Pelvic Complete w Transvaginal & Abdomen/Pelvis:  IMPRESSION:   1. No evidence of torsion.   2. Small amount of clear free fluid in the cul-de-sac.    PENG PORTILLO MD    Laboratory:  Laboratory findings were communicated with the patient who voiced understanding of the findings.    UA: Blood large, albumin 10, leukocyte esterase small, RBC >182 (H), Squamous Epithelial 2 (H), o/w Negative     HCG Qualitative: negative       Emergency Department Course:  Nursing notes and vitals reviewed.    2111: I performed an exam of the patient as documented above.     2220: I rechecked the patient. Findings and plan explained to the Patient. Patient discharged home with instructions regarding supportive care, medications, and reasons to return. The importance of close follow-up was reviewed.     Impression & Plan  "     Medical Decision Making:  This delightful 24-year-old woman presents to us with complaints of having pelvic pain that began after rough intercourse.  The patient notes that she felt a suprapubic discomfort deep in her pelvis after having intercourse approximately 4 days ago.  Since that time, she describes this discomfort continuing, especially being worse when she flexes her pelvic muscles when she stands or when she sits down.  She is comfortable when she is at rest.  She denies any dysuria associated with this.  She denies GI symptoms.  She did initiate her menstrual cycle today.  She denies similar pains in the past.    On exam, she is a well-appearing young woman resting comfortably in the bed.  I am only able to elicit pain when I palpate deep into the pelvis in the suprapubic area.  There is no rebound.  Urinalysis was obtained early in her course which shows hematuria secondary to her period and otherwise a negative pregnancy test.  Pelvic ultrasound is completely unremarkable, though in further conversation with the patient, she describes having significant exacerbation of her pain when the pelvic wand was inserted into the vagina and pressing against her uterus and ovaries.    We spoke at length of how to proceed.  We did discuss obtaining blood work, although without any upper abdominal pain or other GI symptoms, I feel it is unlikely that we are going to find anemia, electrolyte disturbances, liver function test abnormalities, an elevated lipase, or other abnormal kidney function.  I also feel that obtaining a CT scan is unlikely to provide much information and the risk of radiation to her I feel is greater than the likelihood of us missing a serious pathology considering this is highly unlikely to represent appendicitis or diverticulitis.  I believe that this is more related to a musculoskeletal or fascial bruise versus strain from intercourse.  The patient concurs.  Plan will be for pelvic rest  along with ibuprofen and Tylenol.  I anticipate improvement over the next 3-5 days.  She was advised to follow-up with gynecology if she is not showing complete improvement by next week with immediate return to us for any worsening of condition or other emergent concerns.    Diagnosis:    ICD-10-CM    1. Pelvic pain in female R10.2    2. Dyspareunia in female N94.10        Disposition:  discharged to home    Leta Sethi  3/27/2019    EMERGENCY DEPARTMENT  I, Leta Sethi, am serving as a scribe at 9:11 PM on 3/27/2019 to document services personally performed by Trierweiler, Chad A, MD based on my observations and the provider's statements to me.      Trierweiler, Chad A, MD  03/28/19 9320

## 2020-11-12 ENCOUNTER — HOSPITAL ENCOUNTER (EMERGENCY)
Facility: CLINIC | Age: 25
Discharge: HOME | End: 2020-11-12
Attending: EMERGENCY MEDICINE | Admitting: EMERGENCY MEDICINE
Payer: COMMERCIAL

## 2020-11-12 VITALS
RESPIRATION RATE: 16 BRPM | TEMPERATURE: 97.9 F | HEART RATE: 73 BPM | DIASTOLIC BLOOD PRESSURE: 76 MMHG | BODY MASS INDEX: 26.83 KG/M2 | OXYGEN SATURATION: 99 % | WEIGHT: 171.3 LBS | SYSTOLIC BLOOD PRESSURE: 122 MMHG

## 2020-11-12 DIAGNOSIS — M54.50 ACUTE BILATERAL LOW BACK PAIN WITHOUT SCIATICA: ICD-10-CM

## 2020-11-12 PROCEDURE — 99283 EMERGENCY DEPT VISIT LOW MDM: CPT

## 2020-11-12 RX ORDER — CYCLOBENZAPRINE HCL 10 MG
10 TABLET ORAL 3 TIMES DAILY PRN
Qty: 12 TABLET | Refills: 0 | Status: SHIPPED | OUTPATIENT
Start: 2020-11-12 | End: 2020-11-18

## 2020-11-12 RX ORDER — ACETAMINOPHEN 500 MG
500-1000 TABLET ORAL EVERY 6 HOURS PRN
Qty: 60 TABLET | Refills: 0 | Status: SHIPPED | OUTPATIENT
Start: 2020-11-12 | End: 2020-11-19

## 2020-11-12 RX ORDER — IBUPROFEN 600 MG/1
600 TABLET, FILM COATED ORAL EVERY 6 HOURS PRN
Qty: 30 TABLET | Refills: 0 | Status: SHIPPED | OUTPATIENT
Start: 2020-11-12

## 2020-11-12 RX ORDER — METHYLPREDNISOLONE 4 MG
TABLET, DOSE PACK ORAL
Qty: 21 TABLET | Refills: 0 | Status: SHIPPED | OUTPATIENT
Start: 2020-11-12

## 2020-11-12 RX ORDER — LIDOCAINE 50 MG/G
1 PATCH TOPICAL EVERY 24 HOURS
Qty: 7 PATCH | Refills: 0 | Status: SHIPPED | OUTPATIENT
Start: 2020-11-12

## 2020-11-12 ASSESSMENT — ENCOUNTER SYMPTOMS
VOMITING: 0
BACK PAIN: 1

## 2020-11-12 NOTE — ED TRIAGE NOTES
PT c/o back pain since yesterday after work. Became worse after sneezing this morning. ABC intact. A/O X4. No pain meds yet.

## 2021-09-20 NOTE — ED NOTES
All patient questions have been answered, no further questions at this time. Discharge paperwork was gone over with patient. Patient verbalizes understanding of discharge teaching, medications, when to return and the importance of follow up.  Patient verbalizes feeling safe returning home at this time.  
no vomiting/no diarrhea

## 2024-05-17 NOTE — ED PROVIDER NOTES
History     Chief Complaint:  Back Pain     The history is provided by the patient.      Lashell Munguia is a 25 year old female who presents for evaluation of bilateral lower back pain starting yesterday morning after working. The patient notes that she has to bend to  boxes at work often but does not note a specific incident where she may have injured her back. She states that this pain worsened extremely this morning after sneezing. This pain is nonradiating and the patient notes that she has had back pain in the past, however, not this intense or over this area. She has not treated with any pain medications. She denies any vomiting, incontinence, IV drug use or chance of pregnancy.     Allergies:  No Known Drug Allergies     Medications:    The patient is not currently taking any prescribed medications.    Past Medical History:    Migraines  Chlamydia infection    Past Surgical History:    History reviewed.  No pertinent past surgical history.    Family History:    History reviewed. No pertinent family history.    Social History:  The patient presents to the ED alone.  Smoking Status: Never Smoker  Smokeless Tobacco: Never Used  Alcohol Use: No  Drug Use: No  PCP: No primary care provider on file.     Review of Systems   Gastrointestinal: Negative for vomiting.   Musculoskeletal: Positive for back pain (lower, bilateral).   All other systems reviewed and are negative.      Physical Exam     Patient Vitals for the past 24 hrs:   BP Temp Pulse Resp SpO2 Weight   11/12/20 1118 -- -- -- -- -- 77.7 kg (171 lb 4.8 oz)   11/12/20 1117 122/76 97.9  F (36.6  C) 73 16 99 % --       Physical Exam  comfortable appearing  HEENT: mmm  Neck: supple  CV: ppi, regular   Resp: speaking in full sentences with any resp distress  Abd: soft  Back: No TTP midline C/T/L spine, + left lower lumbar Paraspinous muscle TTP  Skin: warm, dry, well perused, no rashes/bruising/lesions on exposed skin  Neuro: alert, follows  commands, speech clear, strength 5/5 and symmetric, SILT in BUE  BLE   5/5 Strength Thigh Flex/Ext, Leg flex/Ext, PF/DF/EHL   SILT all 5 dermatomes BLE   Normal muscular tone                 Gait stable    Psych: nomral mood and affect      Emergency Department Course     Emergency Department Course:  Past medical records, nursing notes, and vitals reviewed.    1200 I performed an exam of the patient as documented above. At this point I feel that the patient is safe for discharge, and the patient agrees.    Findings and plan explained to the patient. Patient discharged home with instructions regarding supportive care, medications, and reasons to return. The importance of close follow-up was reviewed. The patient was prescribed Tylenol, Flexeril, Lidoderm and medrol Dosepak.    I personally answered all related questions prior to discharge.     Impression & Plan     Medical Decision Makin y F with low back pain likely msk vs herniated disc.  No hard signs of cauda equina.  No indication for adv imaging or spine consult.  DC home with conservative mgmt.  Patient verbalized understanding and agreement with plan.  Also understands when to return to the ER.        Diagnosis:    ICD-10-CM    1. Acute bilateral low back pain without sciatica  M54.5        Disposition:  Discharged to home.    Discharge Medications:  Discharge Medication List as of 2020 12:27 PM      START taking these medications    Details   acetaminophen (TYLENOL) 500 MG tablet Take 1-2 tablets (500-1,000 mg) by mouth every 6 hours as needed for pain or fever, Disp-60 tablet, R-0, Local Print      cyclobenzaprine (FLEXERIL) 10 MG tablet Take 1 tablet (10 mg) by mouth 3 times daily as needed for muscle spasms, Disp-12 tablet, R-0, Local Print      lidocaine (LIDODERM) 5 % patch Place 1 patch onto the skin every 24 hours To prevent lidocaine toxicity, patient should be patch free for 12 hrs daily.Disp-7 patch, R-0Local Print       methylPREDNISolone (MEDROL DOSEPAK) 4 MG tablet therapy pack Follow package instructions, Disp-21 tablet, R-0, Local Print             Scribe Disclosure:  I, José Manuel Rae, am serving as a scribe at 11:56 AM on 11/12/2020 to document services personally performed by Ru Cuadra MD based on my observations and the provider's statements to me.      Ru Cuadra MD  11/12/20 2025     Vaccine status unknown